# Patient Record
Sex: MALE | Race: WHITE
[De-identification: names, ages, dates, MRNs, and addresses within clinical notes are randomized per-mention and may not be internally consistent; named-entity substitution may affect disease eponyms.]

---

## 2020-06-03 ENCOUNTER — HOSPITAL ENCOUNTER (EMERGENCY)
Dept: HOSPITAL 62 - ER | Age: 50
Discharge: HOME | End: 2020-06-03
Payer: SELF-PAY

## 2020-06-03 VITALS — DIASTOLIC BLOOD PRESSURE: 92 MMHG | SYSTOLIC BLOOD PRESSURE: 158 MMHG

## 2020-06-03 DIAGNOSIS — F17.200: ICD-10-CM

## 2020-06-03 DIAGNOSIS — K02.9: Primary | ICD-10-CM

## 2020-06-03 PROCEDURE — 99282 EMERGENCY DEPT VISIT SF MDM: CPT

## 2020-06-03 NOTE — ER DOCUMENT REPORT
HPI





- HPI


Time Seen by Provider: 06/03/20 23:37


Pain Level: 4


Notes: 





CHIEF COMPLAINT: Dental pain tonight





HPI: 50-year-old male presenting for dental pain left upper teeth.  Started 

around 7 PM.  States he has a history of chronically poor dentition acknowledges

that he should have had these teeth pulled years ago but did not.  No facial 

swelling no fever





ROS: See HPI - all other systems were reviewed and are otherwise negative


Constitutional: no fever 


Eyes: no drainage, no blurred vision


ENT: no runny nose, no sore throat


Integumentary: no rash 


Allergy: no hives 





MEDICATIONS: I agree with the patient medications as charted by the RN.





ALLERGIES: I agree with the allergies as charted by the RN.





PAST MEDICAL HISTORY/PAST SURGICAL HISTORY: Reviewed and agree as charted by RN.





SOCIAL HISTORY: Reviewed and agree as charted by RN.





FAMILY HISTORY: No significant familial comorbid conditions directly related to 

patient complaint





EXAM:


Reviewed vital signs as charted by RN.


CONSTITUTIONAL: Alert and oriented and responds appropriately to questions. Wel

l-appearing; well-nourished, mild distress secondary to pain


HEAD: Normocephalic; atraumatic


EYES: PERRL; Conjunctivae clear, sclerae non-icteric


ENT: normal nose; no rhinorrhea; moist mucous membranes; pharynx without lesions

noted, no uvula edema or deviation, no tonsillar hypertrophy, phonation normal. 

Dentition is relatively poor.  The left upper canine, first and second premolars

with significant dental caries with erosion into the gingiva.  No fluctuant 

areas in the gingiva are noted adjacent to the teeth.  No trismus.  No 

sublingual swelling.  No visible facial swelling or erythema


NECK: Supple without meningismus; non-tender; no cervical lymphadenopathy, no 

masses


CARD: Capillary refill less than 3 seconds; symmetric distal pulses


RESP: Normal chest excursion without splinting or tachypnea


ABD/GI:  non-distended


BACK:  The back appears normal


EXT: Normal ROM in all joints; no cyanosis, no effusions, no edema   


SKIN: Normal color for age and race; warm; dry; good turgor


NEURO: Moves all extremities equally; Motor and sensory function intact 


PSYCH: The patient's mood and manner are appropriate. Grooming and personal 

hygiene are appropriate.





MDM: 50-year-old male with chronically poor dentition with probably early dental

abscess or pain from dental caries will place on a short course of pain 

medication antibiotics follow-up with a dentist





- REPRODUCTIVE


Reproductive: DENIES: Pregnant:





Past Medical History





- Social History


Smoking Status: Current Every Day Smoker


Family History: Reviewed & Not Pertinent


Patient has homicidal ideation: No


Pulmonary Medical History: 


   Denies: Hx Asthma, Hx Bronchitis, Hx COPD, Hx Pneumonia, Hx Intubation, Hx 

Respiratory Failure, Hx Sleep Apnea, Hx Tuberculosis


Neurological Medical History: Denies: Hx Cerebrovascular Accident, Hx Migraine, 

Hx Seizures


Endocrine Medical History: Denies: Hx Diabetes Mellitus Type 1, Hx Diabetes 

Mellitus Type 2, Hx Graves' Disease, Hx Hyperthyroidism, Hx Hypothyroidism


Renal/ Medical History: Denies: Hx Benign Prostatic Hyperplasia, Hx End Stage 

Renal Disease, Hx Epididymitis, Hx Hemodialysis, Hx Hydrocele, Hx Kidney Stones,

Hx Peritoneal Dialysis, Hx Renal Insufficiency, Hx Testicular Torsion, Hx 

Varicocele





Vertical Provider Document





- INFECTION CONTROL


TRAVEL OUTSIDE OF THE [place] IN LAST 30 DAYS: No





Course





- Vital Signs


Vital signs: 


                                        











Temp Pulse Resp BP Pulse Ox


 


 98.6 F   80   16   158/92 H  97 


 


 06/03/20 23:32  06/03/20 22:47  06/03/20 22:47  06/03/20 22:47  06/03/20 22:47














Discharge





- Discharge


Clinical Impression: 


 Pain due to dental caries





Condition: Stable


Disposition: HOME, SELF-CARE


Additional Instructions: 


1. Take the medications as prescribed, if you were written antibiotics make sure

that you finish them.


2. You need to follow up with a dentist for definitive evaluation and care of 

your dental problems


3. return to the ED for any facial swelling, fever > 101, difficulty swallowing 

or opening the mouth.


4. You may attempt to follow up with the [facility] [phone] for 

further care as well as through the dental list provided.


5. you may want to consider a dental discount plan such as [email or web address] 

to help with costs of dental care as you do not have dental insurance


Prescriptions: 


Naproxen 500 mg PO BID #20 tablet


Hydrocodone/Acetaminophen [Norco 5-325 mg Tablet] 1 tab PO Q4 PRN #15 tablet


 PRN Reason: 


Penicillin V Potassium [Penicillin Vk 500 mg Tablet] 500 mg PO BID #20 tablet

## 2020-07-10 ENCOUNTER — HOSPITAL ENCOUNTER (EMERGENCY)
Dept: HOSPITAL 62 - ER | Age: 50
LOS: 1 days | Discharge: HOME | End: 2020-07-11
Payer: SELF-PAY

## 2020-07-10 DIAGNOSIS — F17.210: ICD-10-CM

## 2020-07-10 DIAGNOSIS — L40.9: Primary | ICD-10-CM

## 2020-07-10 DIAGNOSIS — L03.113: ICD-10-CM

## 2020-07-10 LAB
ADD MANUAL DIFF: NO
ALBUMIN SERPL-MCNC: 4.5 G/DL (ref 3.5–5)
ALP SERPL-CCNC: 88 U/L (ref 38–126)
ANION GAP SERPL CALC-SCNC: 8 MMOL/L (ref 5–19)
AST SERPL-CCNC: 20 U/L (ref 17–59)
BASOPHILS # BLD AUTO: 0.2 10^3/UL (ref 0–0.2)
BASOPHILS NFR BLD AUTO: 1.2 % (ref 0–2)
BILIRUB DIRECT SERPL-MCNC: 0 MG/DL (ref 0–0.4)
BILIRUB SERPL-MCNC: 0.7 MG/DL (ref 0.2–1.3)
BUN SERPL-MCNC: 7 MG/DL (ref 7–20)
CALCIUM: 9.6 MG/DL (ref 8.4–10.2)
CHLORIDE SERPL-SCNC: 99 MMOL/L (ref 98–107)
CO2 SERPL-SCNC: 29 MMOL/L (ref 22–30)
EOSINOPHIL # BLD AUTO: 0.3 10^3/UL (ref 0–0.6)
EOSINOPHIL NFR BLD AUTO: 2.3 % (ref 0–6)
ERYTHROCYTE [DISTWIDTH] IN BLOOD BY AUTOMATED COUNT: 13.4 % (ref 11.5–14)
GLUCOSE SERPL-MCNC: 96 MG/DL (ref 75–110)
HCT VFR BLD CALC: 42.8 % (ref 37.9–51)
HGB BLD-MCNC: 15.2 G/DL (ref 13.5–17)
LYMPHOCYTES # BLD AUTO: 2.6 10^3/UL (ref 0.5–4.7)
LYMPHOCYTES NFR BLD AUTO: 18.2 % (ref 13–45)
MCH RBC QN AUTO: 30.1 PG (ref 27–33.4)
MCHC RBC AUTO-ENTMCNC: 35.6 G/DL (ref 32–36)
MCV RBC AUTO: 85 FL (ref 80–97)
MONOCYTES # BLD AUTO: 1.6 10^3/UL (ref 0.1–1.4)
MONOCYTES NFR BLD AUTO: 11.1 % (ref 3–13)
NEUTROPHILS # BLD AUTO: 9.5 10^3/UL (ref 1.7–8.2)
NEUTS SEG NFR BLD AUTO: 67.2 % (ref 42–78)
PLATELET # BLD: 402 10^3/UL (ref 150–450)
POTASSIUM SERPL-SCNC: 3.9 MMOL/L (ref 3.6–5)
PROT SERPL-MCNC: 7.8 G/DL (ref 6.3–8.2)
RBC # BLD AUTO: 5.07 10^6/UL (ref 4.35–5.55)
TOTAL CELLS COUNTED % (AUTO): 100 %
WBC # BLD AUTO: 14.2 10^3/UL (ref 4–10.5)

## 2020-07-10 PROCEDURE — 96365 THER/PROPH/DIAG IV INF INIT: CPT

## 2020-07-10 PROCEDURE — 80053 COMPREHEN METABOLIC PANEL: CPT

## 2020-07-10 PROCEDURE — 85025 COMPLETE CBC W/AUTO DIFF WBC: CPT

## 2020-07-10 PROCEDURE — 83605 ASSAY OF LACTIC ACID: CPT

## 2020-07-10 PROCEDURE — 36415 COLL VENOUS BLD VENIPUNCTURE: CPT

## 2020-07-10 PROCEDURE — 87040 BLOOD CULTURE FOR BACTERIA: CPT

## 2020-07-10 PROCEDURE — 99283 EMERGENCY DEPT VISIT LOW MDM: CPT

## 2020-07-10 NOTE — ER DOCUMENT REPORT
ED Medical Screen (RME)





- General


Chief Complaint: Skin Problem


Stated Complaint: RIGHT ELBOW PAIN, RASH ON BOTH ARMS


Time Seen by Provider: 07/10/20 22:47


Mode of Arrival: Ambulatory


Information source: Patient


Notes: 





HPI; 50-year-old male presents emergency room with a worsening rash to his 

bilateral elbows and forearms.  Red streaking up the right arm.  States he 

noticed it about 3 to 4 days ago.  Denies any injury.  Denies any fevers.  No 

recent antibiotics.  No medications for symptoms.





PE:


Alert and oriented x3.  Mild distress noted.  Bilateral forearms with erythema, 

excoriated rash noted to both bilateral forearms and elbows.  They are warm and 

tender to palpation without any active discharge or draining noted.  There is 

red streaking going from the right elbow to the right mid upper arm.





I have greeted and performed a rapid initial assessment of this patient.  A 

comprehensive ED assessment and evaluation of the patient, analysis of test 

results and completion of the medical decision making process will be conducted 

by additional ED providers.  I have specifically instructed the patient or 

family members with the patient to immediately return to any nursing staff sh

ould anything change in the patient's condition or with their chief complaint.


TRAVEL OUTSIDE OF THE [place] IN LAST 30 DAYS: No





- Related Data


Allergies/Adverse Reactions: 


                                        





No Known Allergies Allergy (Verified 04/25/18 11:31)


   








Home Medications: zoloft,





Past Medical History


Pulmonary Medical History: 


   Denies: Hx Asthma, Hx Bronchitis, Hx COPD, Hx Pneumonia, Hx Intubation, Hx 

Respiratory Failure, Hx Sleep Apnea, Hx Tuberculosis


Neurological Medical History: Denies: Hx Cerebrovascular Accident, Hx Migraine, 

Hx Seizures


Endocrine Medical History: Denies: Hx Diabetes Mellitus Type 1, Hx Diabetes 

Mellitus Type 2, Hx Graves' Disease, Hx Hyperthyroidism, Hx Hypothyroidism


Renal/ Medical History: Denies: Hx Benign Prostatic Hyperplasia, Hx End Stage 

Renal Disease, Hx Epididymitis, Hx Hemodialysis, Hx Hydrocele, Hx Kidney Stones,

Hx Peritoneal Dialysis, Hx Renal Insufficiency, Hx Testicular Torsion, Hx 

Varicocele





Physical Exam





- Vital signs


Vitals: 





                                        











Temp Pulse Resp BP Pulse Ox


 


 98.4 F   84   18   142/83 H  98 


 


 07/10/20 22:35  07/10/20 22:35  07/10/20 22:35  07/10/20 22:35  07/10/20 22:35














Course





- Vital Signs


Vital signs: 





                                        











Temp Pulse Resp BP Pulse Ox


 


 98.4 F   84   18   142/83 H  98 


 


 07/10/20 22:35  07/10/20 22:35  07/10/20 22:35  07/10/20 22:35  07/10/20 22:35

## 2020-07-11 VITALS — SYSTOLIC BLOOD PRESSURE: 142 MMHG | DIASTOLIC BLOOD PRESSURE: 87 MMHG

## 2020-07-11 NOTE — ER DOCUMENT REPORT
Entered by EVELIA ZACARIAS SCRIBE  20 [id number] 





Acting as scribe for:RAHUL JACOBS IV, MD





ED Skin Rash/Insect Bite/Abscs





- General


Chief Complaint: Skin Problem


Stated Complaint: RIGHT ELBOW PAIN, RASH ON BOTH ARMS


Time Seen by Provider: 07/10/20 22:47


Primary Care Provider: 


DYANA TAMAYO MD [HONORARY] - Follow up as needed


Mode of Arrival: Ambulatory


Notes: 





This 50 year old male patient with a history of psoriasis presents to the ED 

today with complaints of rash to bilateral elbows for the last x3-4 days. 

Patient states that he noticed red streaking from the right elbow up to the 

right upper arm yesterday evening. Denies fever.


TRAVEL OUTSIDE OF THE [place] IN LAST 30 DAYS: No





- Related Data


Allergies/Adverse Reactions: 


                                        





No Known Allergies Allergy (Verified 18 11:31)


   








Home Medications: zoloft,





Past Medical History





- General


Information source: Patient





- Social History


Smoking Status: Current Every Day Smoker


Cigarette use (# per day): Yes


Chew tobacco use (# tins/day): No


Smoking Education Provided: No


Family History: Reviewed & Not Pertinent


Patient has suicidal ideation: No


Patient has homicidal ideation: No


Skin Medical History: Reports Hx Psoriasis





Review of Systems





- Review of Systems


Constitutional: See HPI.  denies: Fever


EENT: No symptoms reported


Cardiovascular: No symptoms reported


Respiratory: No symptoms reported


Gastrointestinal: No symptoms reported


Genitourinary: No symptoms reported


Male Genitourinary: No symptoms reported


Musculoskeletal: No symptoms reported


Skin: See HPI, Rash


Hematologic/Lymphatic: No symptoms reported


Neurological/Psychological: No symptoms reported


-: Yes All other systems reviewed and negative





Physical Exam





- Vital signs


Vitals: 


                                        











Temp Pulse Resp BP Pulse Ox


 


 98.4 F   84   18   142/83 H  98 


 


 07/10/20 22:35  07/10/20 22:35  07/10/20 22:35  07/10/20 22:35  07/10/20 22:35














- General


General appearance: Alert


In distress: None





- HEENT


Head: Normocephalic, Atraumatic


Eyes: Normal


Pupils: PERRL





- Respiratory


Respiratory status: No respiratory distress


Chest status: Nontender


Breath sounds: Normal


Chest palpation: Normal





- Cardiovascular


Rhythm: Regular


Heart sounds: Normal auscultation


Murmur: No


Friction rub: No


Gallop: None auscultated





- Abdominal


Inspection: Normal


Distension: No distension


Bowel sounds: Normal


Tenderness: Nontender - Abdomen soft


Organomegaly: No organomegaly





- Back


Back: Normal, Nontender





- Extremities


General upper extremity: Other - Streaking erythema, back of RUE


General lower extremity: Normal inspection


Elbow: Other - Psoriatic plaques, bilateral elbows.





- Neurological


Neuro grossly intact: Yes


Orientation: AAOx4


[place] Coma Scale Eye Opening: Spontaneous


[place] Coma Scale Verbal: Oriented


[place] Coma Scale Motor: Obeys Commands


[place] Coma Scale Total: 15





- Psychological


Associated symptoms: Normal affect, Normal mood





- Skin


Skin irregularity: Plaque - Psoriatic plaques noted to bilateral elbows. There 

is erythema emanating from plaque on right elbow that is streaking appoximately 

up the back of the RUE





Course





- Re-evaluation


Re-evalutation: 





20 03:10


Results of ED MSE discussed with patient.  All questions were answered prior to 

discharge.  Emergency signs and symptoms, reasons to return to the emergency 

department discussed with patient.





- Vital Signs


Vital signs: 


                                        











Temp Pulse Resp BP Pulse Ox


 


 98.5 F   72   14   151/98 H  98 


 


 20 01:51  20 01:51  20 01:51  20 01:51  20 01:51














- Laboratory


Result Diagrams: 


                                 07/10/20 23:05





                                 07/10/20 23:05


Laboratory results interpreted by me: 


                                        











  07/10/20 07/10/20





  23:05 23:05


 


WBC  14.2 H 


 


Absolute Neuts (auto)  9.5 H 


 


Absolute Monos (auto)  1.6 H 


 


Sodium   135.7 L














Discharge





- Discharge


Clinical Impression: 


 Psoriasis





Cellulitis


Qualifiers:


 Site of cellulitis: extremity Site of cellulitis of extremity: upper extremity 

Laterality: right Qualified Code(s): L03.113 - Cellulitis of right upper limb





Condition: Stable


Disposition: HOME, SELF-CARE


Additional Instructions: 


Return to the Emergency Department without delay if any worse.











HOME CARE INSTRUCTIONS & INFORMATION:  Thank you for choosing us for your 

medical needs. We hope you're satisfied with the care you received.  After you 

leave, you must properly care for your problem and, at the same time, observe 

its progress.  Any condition can change.  Some illnesses can change rapidly over

 hours or days.  If your condition worsens, return to the Emergency Department 

or see your physician promptly.





ABOUT YOUR X-RAYS AND EKG'S:   If you had an EKG or X-rays taken, they have been

 read by the Emergency Physician. The X-rays and EKG's will also be read by a 

Radiologist or Cardiologist within 24 hours.  If discrepancies are noted, you 

will be notified by telephone.  Please be certain the ED has a correct telephone

 number & address where you can be reached.  Also, realize that some fractures 

or abnormalities do not show up on initial X-rays.  If your symptoms continue, 

see your physician.





ABOUT YOUR LABORATORY TEST:   If you had laboratory tests, the results have been

 reviewed by the Emergency Physician.  Some test results (for example cultures) 

may not be available for several days.  You will be contacted if any test result

 shows you need additional treatment.  Please be certain the ED has a correct 

telephone number and address where you can be reached.





ABOUT YOUR MEDICATIONS:  You will receive instructions on how to take your 

medicine on the prescription label you receive.  Additional information may be 

provided by the Pharmacy.  If you have questions afterwards, call the ED for 

clarification or further instructions.  Some prescribed medications may cause 

drowsiness.  Do not perform tasks such as driving a car or operating machinery 

without consulting your Pharmacist.  If you feel you need a refill of pain 

medication, your condition will need re-evaluation.  Please do not call for a 

refill of any medication.





ABOUT YOUR SIGNATURE:   Signature of this document acknowledges to followin. Understanding that you received emergency treatment and that you may be 

   released before al medical problems are known or treated. Please be certain  

    the ED has a correct phone number & address where you can be reached.


   2. Acknowledgement that you will arrange for follow-up care as recommended.


   3. Authorization for the Emergency Physician to provide information to your 

follow-up Physician in order to maximize your care.





AT ANY TIME, IF YOUR SYMPTOMS CHANGE SIGNIFICANTLY OR WORSEN OR YOU DEVELOP NEW 

SYMPTOMS, RETURN TO THE EMERGENCY DEPARTMENT IMMEDIATELY FOR RE-EVALUATION.





OUR GOAL IS TO PROVIDE EXCELLENT MEDICAL CARE!





WE HOPE THAT WE HAVE MET YOUR EXPECTATIONS DURING YOUR EMERGENCY DEPARTMENT 

VISIT AND THAT YOU FEEL YOU HAVE RECEIVED EXCELLENT CARE!








Cellulitis





     You have an infection of your skin and underlying soft tissues called 

cellulitis.  This is due to bacteria, which can enter through any break in the 

skin, or even through an irritated hair follicle.  Untreated, cellulitis will 

usually worsen.


     Antibiotics are required.  Usually, warm packs or warm soaks, and elevation

 of the infected area are recommended.  You should start getting better within 

24 to 36 hours.


     Most infections respond quickly to the right medication. Follow-up care is 

important, however, to check for abscess (boil) formation, unsuspected foreign 

body, or resistant infection.


     If you develop fever, chills, or if the area of infection is becoming 

rapidly more swollen or painful, call the doctor at once.





Prescriptions: 


Clindamycin HCl [Cleocin 150 mg Capsule] 450 mg PO TID 10 Days #90 capsule


Referrals: 


DYANA TAMAYO MD [HONORARY] - Follow up as needed





I personally performed the services described in the documentation, reviewed and

 edited the documentation which was dictated to the scribe in my presence, and 

it accurately records my words and actions.

## 2021-01-10 ENCOUNTER — HOSPITAL ENCOUNTER (EMERGENCY)
Dept: HOSPITAL 62 - ER | Age: 51
LOS: 1 days | Discharge: HOME | End: 2021-01-11
Payer: SELF-PAY

## 2021-01-10 VITALS — DIASTOLIC BLOOD PRESSURE: 90 MMHG | SYSTOLIC BLOOD PRESSURE: 147 MMHG

## 2021-01-10 DIAGNOSIS — K08.409: ICD-10-CM

## 2021-01-10 DIAGNOSIS — K08.9: Primary | ICD-10-CM

## 2021-01-10 PROCEDURE — 99284 EMERGENCY DEPT VISIT MOD MDM: CPT

## 2021-01-11 NOTE — ER DOCUMENT REPORT
ED Oral Problem





- General


Chief Complaint: Dental Injury


Stated Complaint: GUM PAIN,POSSIBLE DRY SOCKET


Time Seen by Provider: 01/10/21 23:54


Primary Care Provider: 


NBA HUDSON PA-C [Primary Care Provider] - Follow up as needed


TRAVEL OUTSIDE OF THE [place] IN LAST 30 DAYS: No





- HPI


Notes: 





Patient is a 51 y/o male who presents with dental pain that began two days ago. 

Patient had two teeth extracted on the bottom right side four days ago.  Patient

contacted his dentist and was prescribed viscous lidocaine and ibuprofen which 

provided no relief. Patient denies fever and vomiting. 





- Related Data


Allergies/Adverse Reactions: 


                                        





No Known Allergies Allergy (Verified 04/25/18 11:31)


   











Past Medical History





- General


Information source: Patient





- Social History


Smoking Status: Unknown if Ever Smoked


Family History: Reviewed & Not Pertinent


Pulmonary Medical History: 


   Denies: Hx Asthma, Hx Bronchitis, Hx COPD, Hx Pneumonia, Hx Intubation, Hx 

Respiratory Failure, Hx Sleep Apnea, Hx Tuberculosis


Neurological Medical History: Denies: Hx Cerebrovascular Accident, Hx Migraine, 

Hx Seizures


Endocrine Medical History: Denies: Hx Diabetes Mellitus Type 1, Hx Diabetes 

Mellitus Type 2, Hx Graves' Disease, Hx Hyperthyroidism, Hx Hypothyroidism


Renal/ Medical History: Denies: Hx Benign Prostatic Hyperplasia, Hx End Stage 

Renal Disease, Hx Epididymitis, Hx Hemodialysis, Hx Hydrocele, Hx Kidney Stones,

Hx Peritoneal Dialysis, Hx Renal Insufficiency, Hx Testicular Torsion, Hx 

Varicocele


Skin Medical History: Reports Hx Psoriasis





Review of Systems





- Review of Systems


Constitutional: No symptoms reported


EENT: See HPI


Cardiovascular: No symptoms reported


Respiratory: No symptoms reported


Gastrointestinal: No symptoms reported


Genitourinary: No symptoms reported


Male Genitourinary: No symptoms reported


Musculoskeletal: No symptoms reported


Skin: No symptoms reported


Hematologic/Lymphatic: No symptoms reported


Neurological/Psychological: No symptoms reported





Physical Exam





- Vital signs


Vitals: 


                                        











Temp Pulse Resp BP Pulse Ox


 


 98.2 F   74   17   147/90 H  99 


 


 01/10/21 22:59  01/10/21 22:59  01/10/21 22:59  01/10/21 22:59  01/10/21 22:59














- Notes


Notes: 





PHYSICAL EXAMINATION:





GENERAL: Well-appearing, well-nourished and in no acute distress.





HEAD: Atraumatic, normocephalic.





EYES:  sclera anicteric, conjunctiva are normal.





ENT: Moist mucous membranes. Extract teeth #32 and 29. No significant 

surrounding erythema or swelling. No exposed root. Oropharynx clear with no 

exudates or uvula deviation. 





NECK: Normal range of motion





LUNGS: Normal work of breathing





HEART: 2+ radial pulses bilaterally





EXTREMITIES: no pitting or edema.  No cyanosis.





NEUROLOGICAL: No focal neurological deficits. Moves all extremities 

spontaneously and on command.





PSYCH: Normal mood, normal affect.





SKIN: Warm, Dry, normal turgor, no rashes or lesions noted.





Course





- Re-evaluation


Re-evalutation: 





Presentation is most consistent with dental pain s/p tooth extraction.  Airway 

is patent.  Vitals within normal limits.  Patient is able swallow without any 

difficulty.  There is no significant facial swelling.  No evidence of [place] 

angina, apical abscess, or airway obstruction.  Patient will be given a dose 

pack of norco for pain relief.  I've instructed to follow-up with dentistry as 

earliest ability for definitive management.  Patient has an appointment on 

1/12/2021.  At this time will discharge with return precautions and follow-up 

recommendations.  Verbal discharge instructions given a the bedside and 

opportunity for questions given. Medication warnings reviewed. Patient is in 

agreement with this plan and has verbalized understanding of return precautions 

and the need for primary care follow-up in the next 24-72 hours.








- Vital Signs


Vital signs: 


                                        











Temp Pulse Resp BP Pulse Ox


 


 98.2 F   74   17   147/90 H  99 


 


 01/10/21 22:59  01/10/21 22:59  01/10/21 22:59  01/10/21 22:59  01/10/21 22:59














- Laboratory Results


Critical Laboratory Results Reviewed: No Critical Results





- Radiology Results


Critical Radiology Results Reviewed: No Critical Results





Discharge





- Discharge


Clinical Impression: 


 Pain, dental





Hx of tooth extraction


Qualifiers:


 Tooth loss class: unspecified tooth loss Qualified Code(s): K08.409 - Partial 

loss of teeth, unspecified cause, unspecified class





Condition: Stable


Disposition: HOME, SELF-CARE


Instructions:  Oral Narcotic Medication ([facility])


Additional Instructions: 


Follow up with your dentist at your scheduled appointment on 1/12/21.  Return to

the emergency department if your symptoms worsen or if you develop fever or 

persistent vomiting.


Referrals: 


NBA HUDSON PA-C [Primary Care Provider] - Follow up as needed